# Patient Record
Sex: MALE | Race: WHITE | NOT HISPANIC OR LATINO | Employment: FULL TIME | URBAN - METROPOLITAN AREA
[De-identification: names, ages, dates, MRNs, and addresses within clinical notes are randomized per-mention and may not be internally consistent; named-entity substitution may affect disease eponyms.]

---

## 2017-04-24 ENCOUNTER — OFFICE VISIT - RIVER FALLS (OUTPATIENT)
Dept: FAMILY MEDICINE | Facility: CLINIC | Age: 42
End: 2017-04-24

## 2017-04-24 ASSESSMENT — MIFFLIN-ST. JEOR: SCORE: 1713.71

## 2017-10-04 ENCOUNTER — AMBULATORY - RIVER FALLS (OUTPATIENT)
Dept: FAMILY MEDICINE | Facility: CLINIC | Age: 42
End: 2017-10-04

## 2018-01-15 ENCOUNTER — OFFICE VISIT - RIVER FALLS (OUTPATIENT)
Dept: FAMILY MEDICINE | Facility: CLINIC | Age: 43
End: 2018-01-15

## 2018-01-15 ASSESSMENT — MIFFLIN-ST. JEOR: SCORE: 1706.46

## 2018-01-16 LAB
CHOLEST SERPL-MCNC: 183 MG/DL
CHOLEST/HDLC SERPL: 4.5 {RATIO}
GLUCOSE BLD-MCNC: 98 MG/DL (ref 65–99)
HDLC SERPL-MCNC: 41 MG/DL
LDLC SERPL CALC-MCNC: 112 MG/DL
NONHDLC SERPL-MCNC: 142 MG/DL
TRIGL SERPL-MCNC: 189 MG/DL

## 2018-10-18 ENCOUNTER — AMBULATORY - RIVER FALLS (OUTPATIENT)
Dept: FAMILY MEDICINE | Facility: CLINIC | Age: 43
End: 2018-10-18

## 2019-10-14 ENCOUNTER — AMBULATORY - RIVER FALLS (OUTPATIENT)
Dept: FAMILY MEDICINE | Facility: CLINIC | Age: 44
End: 2019-10-14

## 2020-10-23 ENCOUNTER — AMBULATORY - RIVER FALLS (OUTPATIENT)
Dept: FAMILY MEDICINE | Facility: CLINIC | Age: 45
End: 2020-10-23

## 2021-11-04 ENCOUNTER — AMBULATORY - RIVER FALLS (OUTPATIENT)
Dept: FAMILY MEDICINE | Facility: CLINIC | Age: 46
End: 2021-11-04

## 2021-11-15 ENCOUNTER — OFFICE VISIT - RIVER FALLS (OUTPATIENT)
Dept: FAMILY MEDICINE | Facility: CLINIC | Age: 46
End: 2021-11-15

## 2021-11-15 ASSESSMENT — MIFFLIN-ST. JEOR: SCORE: 1730.04

## 2021-11-16 ENCOUNTER — COMMUNICATION - RIVER FALLS (OUTPATIENT)
Dept: FAMILY MEDICINE | Facility: CLINIC | Age: 46
End: 2021-11-16

## 2021-11-16 LAB
CHOLEST SERPL-MCNC: 180 MG/DL
CHOLEST/HDLC SERPL: 3.6 {RATIO}
GLUCOSE BLD-MCNC: 99 MG/DL (ref 65–99)
HDLC SERPL-MCNC: 50 MG/DL
LDLC SERPL CALC-MCNC: 100 MG/DL
NONHDLC SERPL-MCNC: 130 MG/DL
TRIGL SERPL-MCNC: 180 MG/DL

## 2022-02-12 VITALS
TEMPERATURE: 97.2 F | SYSTOLIC BLOOD PRESSURE: 124 MMHG | BODY MASS INDEX: 26.92 KG/M2 | HEIGHT: 70 IN | HEART RATE: 70 BPM | DIASTOLIC BLOOD PRESSURE: 74 MMHG | WEIGHT: 188 LBS

## 2022-02-12 VITALS
HEART RATE: 76 BPM | SYSTOLIC BLOOD PRESSURE: 110 MMHG | HEIGHT: 70 IN | WEIGHT: 184.4 LBS | TEMPERATURE: 97.6 F | BODY MASS INDEX: 26.4 KG/M2 | DIASTOLIC BLOOD PRESSURE: 76 MMHG

## 2022-02-12 VITALS
TEMPERATURE: 96.7 F | HEIGHT: 70 IN | HEART RATE: 84 BPM | DIASTOLIC BLOOD PRESSURE: 78 MMHG | SYSTOLIC BLOOD PRESSURE: 110 MMHG | WEIGHT: 182.8 LBS | BODY MASS INDEX: 26.17 KG/M2

## 2022-02-15 NOTE — NURSING NOTE
Comprehensive Intake Entered On:  10/14/2019 3:34 PM CDT    Performed On:  10/14/2019 3:34 PM CDT by Antonio Ambriz               Summary   Chief Complaint :   Pt here today for flu vaccine    Advance Directive :   No   Antonio Ambriz - 10/14/2019 3:34 PM CDT   Consents   Consent for Immunization Exchange :   Consent Granted   Consent for Immunizations to Providers :   Consent Granted   Antonio Ambriz - 10/14/2019 3:34 PM CDT

## 2022-02-15 NOTE — LETTER
(Inserted Image. Unable to display)   [address] [place]  [address]  November 16, 2021                      CITLALLI COTTRELL      [address] [place] [address]        Dear CITLALLI,    Thank you for selecting [facility] for your health care needs.  Below you will find the results of your recent test(s) done at our clinic.     Your tests look good.      Result Name Current Result Reference Range   Glucose Level (mg/dL)  99 11/15/2021 65 - 99   Cholesterol (mg/dL)  180 11/15/2021  - <200   HDL (mg/dL)  50 11/15/2021 > OR = 40 -    Triglyceride (mg/dL) ((H)) 180 11/15/2021  - <150   LDL ((H)) 100 11/15/2021    Cholesterol/HDL Ratio  3.6 11/15/2021  - <5.0   Non-HDL Cholesterol ((H)) 130 11/15/2021  - <130       Please contact me or my assistant at [phone] if you have any questions about your results.    Sincerely,        Yonathan Ahn MD        What do your labs mean?  Below is a glossary of commonly ordered labs:  LDL   Bad Cholesterol   HDL   Good Cholesterol  AST/ALT   Liver Function   Cr/Creatinine   Kidney Function  Microalbumin   Kidney Function  BUN   Kidney Function  PSA   Prostate    TSH   Thyroid Hormone  HgbA1c   Diabetes Test   Hgb (Hemoglobin)   Red Blood Cells

## 2022-02-15 NOTE — PROGRESS NOTES
Chief Complaint    annual px, fasting labs  History of Present Illness      General health status:  good      Diet:  regular      Exercise:  walking      Medical encounters:  none      Dental exam:  8/21      Eye exam:  2 years ago      Caffeine use:  occasional      Tobacco use:  no      Alcohol use:  occasionally      Lipid and diabetes screening:  due      Colon cancer screening:  discussed      Prostate cancer screening:  n/a      Immunizations:  up to date      Other concerns:  heart burn after beer, neck pain, dizziness upon arising in the am      Chronic disease:  none  Review of Systems          Constitutional:  No fever, No chills, No sweats, No weakness, No fatigue.            Eye:  No recent visual problem.            Ear/Nose/Mouth/Throat:  No decreased hearing, No nasal congestion, No sore throat.            Respiratory:  No shortness of breath, No cough.            Cardiovascular:  Negative, No chest pain, No palpitations, No peripheral edema.            Gastrointestinal:  No nausea, No vomiting, No diarrhea, No constipation, No heartburn.            Genitourinary:  No dysuria, No change in urine stream.            Hematology/Lymphatics:  No bruising tendency, No bleeding tendency.            Endocrine:  No cold intolerance, No heat intolerance.            Immunologic:  Negative.            Musculoskeletal:  No back pain, neck pain, No joint pain, No muscle pain.            Integumentary:  No rash, No dryness, No skin lesion.            Neurologic:  Alert and oriented X4, No headache.                Psychiatric:  No anxiety, No depression  Physical Exam   Vitals & Measurements    T: 97.2  F (Tympanic)  HR: 70 (Peripheral)  BP: 124/74     HT: 69.75 in  WT: 188 lb  BMI: 27.17           General:  Alert and oriented, No acute distress.            Eye:  Pupils are equal, round and reactive to light, Extraocular movements are intact, Normal conjunctiva.            HENT:  Normocephalic, Tympanic membranes are  clear, Oral mucosa is moist, No pharyngeal erythema, No sinus tenderness.            Neck:  Supple, Non-tender, No carotid bruit, No lymphadenopathy, No thyromegaly.            Respiratory:  Lungs are clear to auscultation, Respirations are non-labored, Breath sounds are equal, No chest wall tenderness.            Cardiovascular:  Normal rate, Regular rhythm, No murmur, No gallop, Good pulses equal in all extremities, Normal peripheral perfusion, No edema.            Gastrointestinal:  Soft, Non-tender, Non-distended, Normal bowel sounds, No organomegaly.           Genitourinary:  No CVA tenderness          Lymphatics:  WNL.            Musculoskeletal:  Normal range of motion, Normal strength, No tenderness, No swelling, No deformity.            Integumentary:  Warm, Dry, No rash.            Neurologic:  Alert, Oriented, Normal sensory, Normal motor function, No focal deficits.            Psychiatric:  Cooperative, Appropriate mood & affect.   Assessment/Plan       1. Annual physical exam (Z00.00)         Discussed diet, weight management, BMI, activity and exercise        Follow up in one year                2. Heart burn (R12)         advise avoidance of triggers, follow up if worsens                3. BPV (benign positional vertigo) (H81.10)         exam normal today, observe, follow up if worsens                4. Neck pain (M54.2)         appears muscular in nature, advise ROM exercises, neck strengthening, be mindful of ergonomics at work                5. Lipid screening (Z13.220)         Ordered:          Lipid panel with reflex to direct ldl* ([facility]), Specimen Type: Serum, Collection Date: 11/15/21 7:59:00 CST                6. Diabetes mellitus screening (Z13.1)         Ordered:          Glucose* ([facility]), Specimen Type: Serum, Collection Date: 11/15/21 7:59:00 CST           Patient Information     Name:CITLALLI COTTRELL      Address:      [address] [place] [id number]     Sex:Male     Date of  Birth:1975     [phone]     Emergency Contact:ERIK COTTRELL     MRN:[id number]     FIN:[id number]     Location:[facility]     Date of Service:11/15/2021      Primary Care Physician:       Todd Ahn MD, [phone]      Attending Physician:       Todd Ahn MD, [phone]  Problem List/Past Medical History    Ongoing     Seasonal allergies    Historical     No qualifying data  Procedure/Surgical History     Vasectomy (03/27/2008)     Excision biopsy of skin lesion (10/25/2006)     Excision biopsy of skin lesion (08/11/2005)  Medications   No active medications  Allergies    No known allergies  Social History    Smoking Status     Never smoker     Alcohol - Current      Current, 1-2 times per week, 1 drinks/episode average. 2 drinks/episode maximum.     Electronic Cigarette/Vaping      Electronic Cigarette Use: Never.     Employment/School      Employed, Work/School description: .     Exercise      Exercise frequency: Never.     Home/Environment      Marital status: . Spouse/Partner name: Erik. Risks in environment: Does not wear helmet.     Nutrition/Health      Type of diet: Regular.     Sexual      Sexually active: Yes. Sexual orientation: Heterosexual.     Substance Abuse - Denies Substance Abuse      Never     Tobacco - Denies Tobacco Use      Never (less than 100 in lifetime)  Family History    Crohn's disease: Daughter.    Enlarged prostate: Father.    Gallbladder: Mother.    Stroke: Sister.    Daughter: History is negative    Daughter: History is negative  Immunizations       Scheduled Immunizations       Dose Date(s)       SARS-CoV-2 (COVID-19) Pfizer-162b2       03/25/2021, 04/15/2021       Other Immunizations               influenza       09/17/2009, 11/01/2010       influenza virus vaccine, inactivated       10/13/2014, 10/12/2015, 10/04/2017, 10/18/2018, 10/14/2019, 10/23/2020, 11/04/2021       tetanus/diphth/pertuss (Tdap) adult/adol        04/10/2015

## 2022-02-15 NOTE — NURSING NOTE
Comprehensive Intake Entered On:  11/15/2021 7:36 AM CST    Performed On:  11/15/2021 7:30 AM CST by Taya Levy LPN               Summary   Chief Complaint :   annual px, fasting labs   Advance Directive :   No   Weight Measured :   188 lb(Converted to: 188 lb 0 oz, 85.275 kg)    Height Measured :   69.75 in(Converted to: 5 ft 10 in, 177.16 cm)    Body Mass Index :   27.17 kg/m2 (HI)    Body Surface Area :   2.05 m2   Systolic Blood Pressure :   124 mmHg   Diastolic Blood Pressure :   74 mmHg   Mean Arterial Pressure :   91 mmHg   Peripheral Pulse Rate :   70 bpm   BP Site :   Right arm   Pulse Site :   Radial artery   BP Method :   Manual   HR Method :   Manual   Temperature Tympanic :   97.2 DegF(Converted to: 36.2 DegC)  (LOW)    Taya Levy LPN - 11/15/2021 7:30 AM CST   Health Status   Allergies Verified? :   Yes   Medication History Verified? :   Yes   Pre-Visit Planning Status :   Completed   Tobacco Use? :   Never smoker   Taya Levy LPN - 11/15/2021 7:30 AM CST   Consents   Consent for Immunization Exchange :   Consent Granted   Consent for Immunizations to Providers :   Consent Granted   Taya Levy LPN - 11/15/2021 7:30 AM CST   Meds / Allergies   (As Of: 11/15/2021 7:36:22 AM CST)   Allergies (Active)   No known allergies  Estimated Onset Date:   Unspecified ; Created By:   Priscila Ruiz; Reaction Status:   Active ; Category:   Drug ; Substance:   No known allergies ; Type:   Allergy ; Updated By:   Priscila Ruiz; Reviewed Date:   11/15/2021 7:33 AM CST        Medication List   (As Of: 11/15/2021 7:36:22 AM CST)   Home Meds    zinc sulfate  :   zinc sulfate ; Status:   Processing ; Ordered As Mnemonic:   Zinc ; Action Display:   Complete ; Catalog Code:   zinc sulfate ; Order Dt/Tm:   11/15/2021 7:33:25 AM CST            Depression Screening   Little Interest - Pleasure in Activities :   Not at all   Feeling Down, Depressed, Hopeless :   Not at all   Initial Depression  Screen Score :   0 Score   Poor Appetite or Overeating :   Several days   Trouble Falling or Staying Asleep :   Several days   Feeling Tired or Little Energy :   Several days   Feeling Bad About Yourself :   Not at all   Trouble Concentrating :   Several days   Moving or Speaking Slowly :   Not at all   Thoughts Better Off Dead or Hurting Self :   Not at all   Difficulty at Work, Home, Getting Along :   Not difficult at all   Detailed Depression Screen Score :   4    Total Depression Screen Score :   4    Taya Levy LPN - 11/15/2021 7:30 AM CST   Social History   Social History   (As Of: 11/15/2021 7:36:22 AM CST)   Alcohol:  Current      Current, 1-2 times per week, 1 drinks/episode average.  2 drinks/episode maximum.   (Last Updated: 4/25/2017 8:23:28 AM CDT by Indu Lei)          Tobacco:  Denies Tobacco Use      Never (less than 100 in lifetime)   (Last Updated: 11/15/2021 7:30:27 AM CST by Taya Levy LPN)          Electronic Cigarette/Vaping:        Electronic Cigarette Use: Never.   (Last Updated: 11/15/2021 7:30:31 AM CST by Taya Levy LPN)          Substance Abuse:  Denies Substance Abuse      Never   (Last Updated: 4/25/2017 8:23:38 AM CDT by Indu Lei)          Employment/School:        Employed, Work/School description: .   (Last Updated: 4/13/2015 8:35:43 AM CDT by Casimiro Brown CMA)          Home/Environment:        Marital status: .  Spouse/Partner name: Alanna.  Risks in environment: Does not wear helmet.   (Last Updated: 4/25/2017 8:23:55 AM CDT by Indu Lei)          Nutrition/Health:        Type of diet: Regular.   (Last Updated: 4/25/2017 8:24:00 AM CDT by Indu Lei)          Exercise:        Exercise frequency: Never.   (Last Updated: 4/25/2017 8:24:06 AM CDT by Indu Lei)          Sexual:        Sexually active: Yes.  Sexual orientation: Heterosexual.   (Last Updated: 4/25/2017 8:24:14 AM CDT by Indu Lei)

## 2022-02-15 NOTE — PROGRESS NOTES
Patient:   CITLALLI COTTRELL            MRN: [id number]            FIN: [id number]               Age:   42 years     Sex:  Male     :  1975   Associated Diagnoses:   Well adult; Scrotal nodule   Author:   Todd Ahn MD      Visit Information      Date of Service: 01/15/2018 08:15 am  Performing Location: [facility]  Encounter#: [id number]      Primary Care Provider (PCP):  Todd Ahn MD    NPI# [id number]   Visit type:  Annual exam.    Accompanied by:  No one.    Source of history:  Self, Medical record.    History limitation:  None.       Chief Complaint   1/15/2018 8:16 AM CST    here for f/u--is needing fasting labs.   also found lump on right testicle over the past couple of wks.        Well Adult History   Well Adult History             The patient presents for well adult exam.  The patient's general health status is described as good.  The patient's diet is described as balanced.  Exercise: occasional.  Associated symptoms consist of none.  Additional pertinent history: daily caffeine use, tobacco use none and occasional alcohol use.       colonoscopy:  n/a  lipid and diabetes screening:  due  prostate cancer screening:  n/a  heart disease risk:  low  immunizations:  up to date   other:  found a lump on testicle         Review of Systems   Constitutional:  No fever, No chills, No sweats, No weakness, No fatigue.    Eye:  No recent visual problem.    Ear/Nose/Mouth/Throat:  No decreased hearing, No nasal congestion, No sore throat.    Respiratory:  No shortness of breath, No cough.    Cardiovascular:  Negative, No chest pain, No palpitations, No peripheral edema.    Gastrointestinal:  No nausea, No vomiting, No diarrhea, No constipation, No heartburn.    Genitourinary:  Negative except as documented in history of present illness, No dysuria, No change in urine stream.    Hematology/Lymphatics:  No bruising tendency, No bleeding tendency.    Endocrine:  No cold intolerance, No heat  intolerance.    Immunologic:  Negative.    Musculoskeletal:  No back pain, No neck pain, No joint pain, No muscle pain.    Integumentary:  No rash, No dryness, No skin lesion.    Neurologic:  Alert and oriented X4, No headache.    Psychiatric:  No anxiety, No depression.      PHQ9 and CAGE reviewed and discussed with patient.       Health Status   Allergies:    Allergic Reactions (Selected)  No known allergies   Medications:  (Selected)   Documented Medications  Documented  Zinc: ( 140 mg ), po, daily, 0 Refill(s), Type: Maintenance   Problem list:    All Problems  Seasonal allergies / SNOMED CT 747088681 / Confirmed      Histories   Past Medical History:    Active  Seasonal allergies (308142264)   Family History:    Gallbladder  Mother  Stroke  Sister  Crohn's disease  Daughter (Bailey)  Enlarged prostate  Father     Procedure history:    Vasectomy (SNOMED CT 91082890) on 3/27/2008 at 33 Years.  Excision biopsy of skin lesion (SNOMED CT 960568542) on 10/25/2006 at 31 Years.  Excision biopsy of skin lesion (SNOMED CT 537872108) on 8/11/2005 at 30 Years.   Social History:        Alcohol Assessment: Current            Current, 1-2 times per week, 1 drinks/episode average.  2 drinks/episode maximum.      Tobacco Assessment: Denies Tobacco Use            Never      Substance Abuse Assessment: Denies Substance Abuse            Never      Employment and Education Assessment            Employed, Work/School description: .      Home and Environment Assessment            Marital status: .  Spouse/Partner name: Alanna.  Risks in environment: Does not wear helmet.      Nutrition and Health Assessment            Type of diet: Regular.      Exercise and Physical Activity Assessment            Exercise frequency: Never.      Sexual Assessment            Sexually active: Yes.  Sexual orientation: Heterosexual.        Physical Examination   Vital Signs   1/15/2018 8:16 AM CST Temperature Tympanic 96.7 DegF   LOW    Peripheral Pulse Rate 84 bpm    Pulse Site Radial artery    HR Method Manual    Systolic Blood Pressure 110 mmHg    Diastolic Blood Pressure 78 mmHg    Mean Arterial Pressure 89 mmHg    BP Site Right arm    BP Method Manual      Measurements from flowsheet : Measurements   1/15/2018 8:16 AM CST Height Measured - Standard 69.75 in    Weight Measured - Standard 182.8 lb    BSA 2.02 m2    Body Mass Index 26.41 kg/m2  HI      General:  Alert and oriented, No acute distress.    Eye:  Pupils are equal, round and reactive to light, Extraocular movements are intact, Normal conjunctiva.    HENT:  Normocephalic, Tympanic membranes are clear, Oral mucosa is moist, No pharyngeal erythema, No sinus tenderness.    Neck:  Supple, Non-tender, No carotid bruit, No lymphadenopathy, No thyromegaly.    Respiratory:  Lungs are clear to auscultation, Respirations are non-labored, Breath sounds are equal, No chest wall tenderness.    Cardiovascular:  Normal rate, Regular rhythm, No murmur, No gallop, Good pulses equal in all extremities, Normal peripheral perfusion, No edema.    Gastrointestinal:  Soft, Non-tender, Non-distended, Normal bowel sounds, No organomegaly.    Genitourinary:  No costovertebral angle tenderness.         Scrotum: tender nodule right spermatic cord.         Testes: Within normal limits.         Epididymis: Within normal limits.    Lymphatics:  WNL.    Musculoskeletal:  Normal range of motion, Normal strength, No tenderness, No swelling, No deformity.    Integumentary:  Warm, Dry, No rash.    Neurologic:  Alert, Oriented, Normal sensory, Normal motor function, No focal deficits.    Psychiatric:  Cooperative, Appropriate mood & affect.       Impression and Plan   Diagnosis     Well adult (EQC39-IZ Z00.00).     Scrotal nodule (XVC63-OT N49.2).     Course:  Progressing as expected.    Plan:  Discussed health maintenance, diet, exercise, BMI discussed, scrotal US ordered.         Follow-up: After diagnostic test  to discuss results.    Orders     Orders (Selected)   Outpatient Orders  Ordered (Dispatched)  Glucose* ([facility]): Specimen Type: Serum, Collection Date: 01/15/18 8:24:00 CST  Lipid panel with reflex to direct ldl* ([facility]): Specimen Type: Serum, Collection Date: 01/15/18 8:24:00 CST  Completed  US Scrotal (Request): Scrotal nodule.

## 2022-02-15 NOTE — PROGRESS NOTES
Patient:   CITLALLI COTTRELL            MRN: [id number]            FIN: [id number]               Age:   42 years     Sex:  Male     :  1975   Associated Diagnoses:   Well adult   Author:   Todd Ahn MD      Visit Information      Date of Service: 2017 12:45 pm  Performing Location: [facility]  Encounter#: [id number]      Primary Care Provider (PCP):  Todd Ahn MD    NPI# [id number]   Visit type:  Annual exam.    Accompanied by:  No one.    Source of history:  Self, Medical record.    History limitation:  None.       Chief Complaint   2017 12:51 PM CDT   annual px and has form to have filled out.      Well Adult History   Well Adult History             The patient presents for well adult exam.  The patient's general health status is described as good.  The patient's diet is described as balanced.  Exercise: none.  Associated symptoms consist of none.  Medical encounters: none.  Additional pertinent history: daily caffeine use, tobacco use none and occasional alcohol use.       colonoscopy:  n/a  lipid and diabetes screening:  up to date   prostate cancer screening:  n/a  heart disease risk:  low  immunizations:  up to date   other:         Review of Systems   Constitutional:  No fever, No chills, No sweats, No weakness, No fatigue.    Eye:  No recent visual problem.    Ear/Nose/Mouth/Throat:  No decreased hearing, No nasal congestion, No sore throat.    Respiratory:  No shortness of breath, No cough.    Cardiovascular:  Negative, No chest pain, No palpitations, No peripheral edema.    Gastrointestinal:  No nausea, No vomiting, No diarrhea, No constipation, No heartburn.    Genitourinary:  No dysuria, No change in urine stream.    Hematology/Lymphatics:  No bruising tendency, No bleeding tendency.    Endocrine:  No cold intolerance, No heat intolerance.    Immunologic:  Negative.    Musculoskeletal:  No back pain, No neck pain, No joint pain, No muscle pain.    Integumentary:   No rash, No dryness, No skin lesion.    Neurologic:  Alert and oriented X4, No headache.    Psychiatric:  No anxiety, No depression.      PHQ9 and CAGE reviewed and discussed with patient.       Health Status   Allergies:    Allergic Reactions (Selected)  No known allergies   Medications:  (Selected)      Problem list:    No problem items selected or recorded.      Histories   Past Medical History:    No active or resolved past medical history items have been selected or recorded.   Family History:    Gallbladder  Mother     Procedure history:    Vasectomy (SNOMED CT 31274013) on 3/27/2008 at 33 Years.  Excision biopsy of skin lesion (SNOMED CT 380900569) on 10/25/2006 at 31 Years.  Excision biopsy of skin lesion (SNOMED CT 397612893) on 8/11/2005 at 30 Years.   Social History:        Alcohol Assessment: Current            Current                     Comments:                      05/19/2010 - Priscila Goldman                     very minimal use      Tobacco Assessment: Denies Tobacco Use            Never      Substance Abuse Assessment: Denies Substance Abuse      Employment and Education Assessment            Employed, Work/School description: .      Home and Environment Assessment            Marital status: .  Spouse/Partner name: Alanna.        Physical Examination   Vital Signs   4/24/2017 12:51 PM CDT Temperature Tympanic 97.6 DegF  LOW    Peripheral Pulse Rate 76 bpm    Pulse Site Radial artery    HR Method Manual    Systolic Blood Pressure 110 mmHg    Diastolic Blood Pressure 76 mmHg    Mean Arterial Pressure 87 mmHg    BP Site Right arm    BP Method Manual      Measurements from flowsheet : Measurements   4/24/2017 12:51 PM CDT Height Measured - Standard 69.75 in    Weight Measured - Standard 184.4 lb    BSA 2.03 m2    Body Mass Index 26.65 kg/m2      General:  Alert and oriented, No acute distress.    Eye:  Pupils are equal, round and reactive to light, Extraocular movements are  intact, Normal conjunctiva.    HENT:  Normocephalic, Tympanic membranes are clear, Oral mucosa is moist, No pharyngeal erythema, No sinus tenderness.    Neck:  Supple, Non-tender, No carotid bruit, No lymphadenopathy, No thyromegaly.    Respiratory:  Lungs are clear to auscultation, Respirations are non-labored, Breath sounds are equal, No chest wall tenderness.    Cardiovascular:  Normal rate, Regular rhythm, No murmur, No gallop, Good pulses equal in all extremities, Normal peripheral perfusion, No edema.    Gastrointestinal:  Soft, Non-tender, Non-distended, Normal bowel sounds, No organomegaly.    Genitourinary:  No costovertebral angle tenderness.    Lymphatics:  WNL.    Musculoskeletal:  Normal range of motion, Normal strength, No tenderness, No swelling, No deformity.    Integumentary:  Warm, Dry, No rash.    Neurologic:  Alert, Oriented, Normal sensory, Normal motor function, No focal deficits.    Psychiatric:  Cooperative, Appropriate mood & affect.       Impression and Plan   Diagnosis     Well adult (IWS57-DG Z00.00).     Course:  Progressing as expected.    Plan:  Discussed health maintenance, diet, exercise, BMI discussed.

## 2023-02-23 PROBLEM — J30.2 SEASONAL ALLERGIC RHINITIS: Status: ACTIVE | Noted: 2023-02-23

## 2023-02-27 ENCOUNTER — LAB (OUTPATIENT)
Dept: FAMILY MEDICINE | Facility: CLINIC | Age: 48
End: 2023-02-27

## 2023-02-27 ENCOUNTER — OFFICE VISIT (OUTPATIENT)
Dept: FAMILY MEDICINE | Facility: CLINIC | Age: 48
End: 2023-02-27
Payer: COMMERCIAL

## 2023-02-27 VITALS
OXYGEN SATURATION: 99 % | HEIGHT: 70 IN | WEIGHT: 193.6 LBS | SYSTOLIC BLOOD PRESSURE: 120 MMHG | HEART RATE: 80 BPM | RESPIRATION RATE: 16 BRPM | DIASTOLIC BLOOD PRESSURE: 81 MMHG | TEMPERATURE: 97.4 F | BODY MASS INDEX: 27.72 KG/M2

## 2023-02-27 DIAGNOSIS — N62 GYNECOMASTIA, MALE: Primary | ICD-10-CM

## 2023-02-27 DIAGNOSIS — Z13.6 SCREENING FOR CARDIOVASCULAR CONDITION: ICD-10-CM

## 2023-02-27 DIAGNOSIS — Z12.11 SCREEN FOR COLON CANCER: ICD-10-CM

## 2023-02-27 PROBLEM — M77.11 RIGHT TENNIS ELBOW: Status: ACTIVE | Noted: 2022-04-19

## 2023-02-27 LAB
CHOLEST SERPL-MCNC: 196 MG/DL
HDLC SERPL-MCNC: 45 MG/DL
LDLC SERPL CALC-MCNC: 109 MG/DL
NONHDLC SERPL-MCNC: 151 MG/DL
TRIGL SERPL-MCNC: 210 MG/DL

## 2023-02-27 PROCEDURE — 36415 COLL VENOUS BLD VENIPUNCTURE: CPT | Performed by: FAMILY MEDICINE

## 2023-02-27 PROCEDURE — 99212 OFFICE O/P EST SF 10 MIN: CPT | Performed by: FAMILY MEDICINE

## 2023-02-27 PROCEDURE — 80061 LIPID PANEL: CPT | Performed by: FAMILY MEDICINE

## 2023-02-27 NOTE — PROGRESS NOTES
"  Assessment & Plan     Gynecomastia, male  Male gynecomastia  Reassurance continue to monitor and if symptoms worsen or this changes pursue further work-up.    Screen for colon cancer  Cologuard has been ordered  - COLOGUARD([employer]); Future    Screening for cardiovascular condition  Lipid panel today  - Lipid panel reflex to direct LDL Fasting; Future             BMI:   Estimated body mass index is 27.98 kg/m  as calculated from the following:    Height as of this encounter: 1.772 m (5' 9.75\").    Weight as of this encounter: 87.8 kg (193 lb 9.6 oz).           No follow-ups on file.    Todd Ahn MD  [facility]    Yessenia Barfield is a 47 year old accompanied by his self, presenting for the following health issues:  Derm Problem (Check lump on chest)      History of Present Illness       Reason for visit:  Lump in my chest  Symptom onset:  1-2 weeks ago    He eats 0-1 servings of fruits and vegetables daily.He consumes 0 sweetened beverage(s) daily.He exercises with enough effort to increase his heart rate 10 to 19 minutes per day.  He exercises with enough effort to increase his heart rate 3 or less days per week.   He is taking medications regularly.       Patient is here to discuss a lump on the left side of his chest, breast area.  Noticed about 2 weeks ago, does have some tenderness when pressure is applied or bumped.  Denies trauma to area.    Patient also wants to discuss swollen lymph nodes to his groin area that he noticed around Christmas 2022.  He denied having pain, lumps have improved over time.  Denies any recent illness.    Review of Systems   Constitutional, HEENT, cardiovascular, pulmonary, gi and gu systems are negative, except as otherwise noted.      Objective    /81 (BP Location: Right arm, Patient Position: Sitting, Cuff Size: Adult Large)   Pulse 80   Temp 97.4  F (36.3  C) (Tympanic)   Resp 16   Ht 1.772 m (5' 9.75\")   Wt 87.8 kg (193 " lb 9.6 oz)   SpO2 99%   BMI 27.98 kg/m    Body mass index is 27.98 kg/m .  Physical Exam   Alert, oriented, no acute distress  Neck is supple no adenopathy  Lungs are clear  Heart has a regular rate and rhythm  Exam of the breast reveal slightly prominent breast tissue on the left.  There is a similar area on the right which is smaller.  No tenderness.  No discrete mass.  No skin changes.  Cooperative, appropriate affect

## 2023-02-27 NOTE — LETTER
February 27, 2023      Lico BELLAMY Sotomayor  [address] [id number]        Dear ,    We are writing to inform you of your test results.    Your test results fall within the expected range(s) or remain unchanged from previous results.  Please continue with current treatment plan.    Resulted Orders   Lipid panel reflex to direct LDL Fasting   Result Value Ref Range    Cholesterol 196 <200 mg/dL    Triglycerides 210 (H) <150 mg/dL    Direct Measure HDL 45 >=40 mg/dL    LDL Cholesterol Calculated 109 (H) <=100 mg/dL    Non HDL Cholesterol 151 (H) <130 mg/dL    Narrative    Cholesterol  Desirable:  <200 mg/dL    Triglycerides  Normal:  Less than 150 mg/dL  Borderline High:  150-199 mg/dL  High:  200-499 mg/dL  Very High:  Greater than or equal to 500 mg/dL    Direct Measure HDL  Female:  Greater than or equal to 50 mg/dL   Male:  Greater than or equal to 40 mg/dL    LDL Cholesterol  Desirable:  <100mg/dL  Above Desirable:  100-129 mg/dL   Borderline High:  130-159 mg/dL   High:  160-189 mg/dL   Very High:  >= 190 mg/dL    Non HDL Cholesterol  Desirable:  130 mg/dL  Above Desirable:  130-159 mg/dL  Borderline High:  160-189 mg/dL  High:  190-219 mg/dL  Very High:  Greater than or equal to 220 mg/dL       If you have any questions or concerns, please call the clinic at the number listed above.       Sincerely,      Todd Ahn MD

## 2023-03-11 LAB — NONINV COLON CA DNA+OCC BLD SCRN STL QL: NEGATIVE

## 2025-01-05 SDOH — HEALTH STABILITY: PHYSICAL HEALTH: ON AVERAGE, HOW MANY MINUTES DO YOU ENGAGE IN EXERCISE AT THIS LEVEL?: 20 MIN

## 2025-01-05 SDOH — HEALTH STABILITY: PHYSICAL HEALTH: ON AVERAGE, HOW MANY DAYS PER WEEK DO YOU ENGAGE IN MODERATE TO STRENUOUS EXERCISE (LIKE A BRISK WALK)?: 1 DAY

## 2025-01-05 ASSESSMENT — SOCIAL DETERMINANTS OF HEALTH (SDOH): HOW OFTEN DO YOU GET TOGETHER WITH FRIENDS OR RELATIVES?: TWICE A WEEK

## 2025-01-08 ENCOUNTER — OFFICE VISIT (OUTPATIENT)
Dept: FAMILY MEDICINE | Facility: CLINIC | Age: 50
End: 2025-01-08
Payer: COMMERCIAL

## 2025-01-08 VITALS
RESPIRATION RATE: 15 BRPM | TEMPERATURE: 98.1 F | SYSTOLIC BLOOD PRESSURE: 120 MMHG | HEART RATE: 69 BPM | WEIGHT: 186 LBS | HEIGHT: 69 IN | DIASTOLIC BLOOD PRESSURE: 80 MMHG | BODY MASS INDEX: 27.55 KG/M2 | OXYGEN SATURATION: 96 %

## 2025-01-08 DIAGNOSIS — Z01.84 IMMUNITY STATUS TESTING: ICD-10-CM

## 2025-01-08 DIAGNOSIS — Z23 ENCOUNTER FOR IMMUNIZATION: ICD-10-CM

## 2025-01-08 DIAGNOSIS — Z00.00 ROUTINE PHYSICAL EXAMINATION: Primary | ICD-10-CM

## 2025-01-08 DIAGNOSIS — Z11.59 NEED FOR HEPATITIS C SCREENING TEST: ICD-10-CM

## 2025-01-08 DIAGNOSIS — Z11.4 SCREENING FOR HIV (HUMAN IMMUNODEFICIENCY VIRUS): ICD-10-CM

## 2025-01-08 DIAGNOSIS — E66.3 OVERWEIGHT: ICD-10-CM

## 2025-01-08 DIAGNOSIS — E78.5 DYSLIPIDEMIA: ICD-10-CM

## 2025-01-08 DIAGNOSIS — Z12.5 SCREENING FOR PROSTATE CANCER: ICD-10-CM

## 2025-01-08 LAB
CHOLEST SERPL-MCNC: 198 MG/DL
EST. AVERAGE GLUCOSE BLD GHB EST-MCNC: 120 MG/DL
FASTING STATUS PATIENT QL REPORTED: NO
HBA1C MFR BLD: 5.8 %
HCV AB SERPL QL IA: NONREACTIVE
HDLC SERPL-MCNC: 47 MG/DL
LDLC SERPL CALC-MCNC: 112 MG/DL
NONHDLC SERPL-MCNC: 151 MG/DL
PSA SERPL DL<=0.01 NG/ML-MCNC: 0.64 NG/ML (ref 0–3.5)
TRIGL SERPL-MCNC: 194 MG/DL

## 2025-01-08 PROCEDURE — 99396 PREV VISIT EST AGE 40-64: CPT | Mod: 25 | Performed by: FAMILY MEDICINE

## 2025-01-08 PROCEDURE — 36415 COLL VENOUS BLD VENIPUNCTURE: CPT | Performed by: FAMILY MEDICINE

## 2025-01-08 PROCEDURE — 90673 RIV3 VACCINE NO PRESERV IM: CPT | Performed by: FAMILY MEDICINE

## 2025-01-08 PROCEDURE — 86706 HEP B SURFACE ANTIBODY: CPT | Performed by: FAMILY MEDICINE

## 2025-01-08 PROCEDURE — 91320 SARSCV2 VAC 30MCG TRS-SUC IM: CPT | Performed by: FAMILY MEDICINE

## 2025-01-08 PROCEDURE — 87389 HIV-1 AG W/HIV-1&-2 AB AG IA: CPT | Performed by: FAMILY MEDICINE

## 2025-01-08 PROCEDURE — 90480 ADMN SARSCOV2 VAC 1/ONLY CMP: CPT | Performed by: FAMILY MEDICINE

## 2025-01-08 PROCEDURE — 83036 HEMOGLOBIN GLYCOSYLATED A1C: CPT | Performed by: FAMILY MEDICINE

## 2025-01-08 PROCEDURE — G0103 PSA SCREENING: HCPCS | Performed by: FAMILY MEDICINE

## 2025-01-08 PROCEDURE — 90472 IMMUNIZATION ADMIN EACH ADD: CPT | Performed by: FAMILY MEDICINE

## 2025-01-08 PROCEDURE — 90750 HZV VACC RECOMBINANT IM: CPT | Performed by: FAMILY MEDICINE

## 2025-01-08 PROCEDURE — 80061 LIPID PANEL: CPT | Performed by: FAMILY MEDICINE

## 2025-01-08 PROCEDURE — 90471 IMMUNIZATION ADMIN: CPT | Performed by: FAMILY MEDICINE

## 2025-01-08 PROCEDURE — 86803 HEPATITIS C AB TEST: CPT | Performed by: FAMILY MEDICINE

## 2025-01-08 ASSESSMENT — PAIN SCALES - GENERAL: PAINLEVEL_OUTOF10: NO PAIN (0)

## 2025-01-08 NOTE — LETTER
"January 9, 2025      Lico Sotomayor  [address] [facility] [address]        Dear ,    We are writing to inform you of your test results.    Your routine HIV screen was normal.     Your hepatitis C screen based on age criteria was normal.     Your cholesterol results were elevated.  Ensure regular exercise, healthy diet, and weight loss modifications in order to further improve.  Weight goal < 180 pounds initially, < 175 pounds ideally.  We will plan to recheck your labs while fasting in the next 6-12 months to ensure desired improvement.      Your labs suggest \"pre-diabetes\".  Goal \"average blood sugar\" (i.e. A1c) is < 5.7%.  Your A1c was 5.8%. Ensure regular exercise, healthy diet, and weight loss modifications in order to further improve.  Weight goal < 180 pounds initially, < 175 pounds ideally.  We will continue to follow closely.      Your prostate cancer screening test (i.e.PSA) was normal.     No evidence for current immunity to hepatitis B.  Consider hepatitis B immunization series in future in order to achieve desired immunity.     Resulted Orders   HIV Antigen Antibody Combo   Result Value Ref Range    HIV Antigen Antibody Combo Nonreactive Nonreactive      Comment:      Negative HIV-1 p24 antigen and HIV-1/2 antibody screening test results usually indicate the absence of HIV-1 and HIV-2 infection. However, such negative results do not rule-out acute HIV infection.  If acute HIV-1 or HIV-2 infection is suspected, detection of HIV-1 or HIV-2 RNA  is recommended. This result is obtained using the Roche Elecsys HIV Duo method on the andrew e801 immunoassay analyzer.   Hepatitis C Screen Reflex to HCV RNA Quant and Genotype   Result Value Ref Range    Hepatitis C Antibody Nonreactive Nonreactive      Comment:      A nonreactive screening test result does not exclude the possibility of exposure to or infection with HCV. Nonreactive screening test results in individuals with prior exposure to HCV " may be due to antibody levels below the limit of detection of this assay or lack of reactivity to the HCV antigens used in this assay. Patients with recent HCV infections (<3 months from time of exposure) may have false-negative HCV antibody results due to the time needed for seroconversion (average of 8 to 9 weeks).   Lipid panel reflex to direct LDL Non-fasting   Result Value Ref Range    Cholesterol 198 <200 mg/dL    Triglycerides 194 (H) <150 mg/dL    Direct Measure HDL 47 >=40 mg/dL    LDL Cholesterol Calculated 112 (H) <100 mg/dL    Non HDL Cholesterol 151 (H) <130 mg/dL    Patient Fasting > 8hrs? No     Narrative    Cholesterol  Desirable: < 200 mg/dL  Borderline High: 200 - 239 mg/dL  High: >= 240 mg/dL    Triglycerides  Normal: < 150 mg/dL  Borderline High: 150 - 199 mg/dL  High: 200-499 mg/dL  Very High: >= 500 mg/dL    Direct Measure HDL  Female: >= 50 mg/dL   Male: >= 40 mg/dL    LDL Cholesterol  Desirable: < 100 mg/dL  Above Desirable: 100 - 129 mg/dL   Borderline High: 130 - 159 mg/dL   High:  160 - 189 mg/dL   Very High: >= 190 mg/dL    Non HDL Cholesterol  Desirable: < 130 mg/dL  Above Desirable: 130 - 159 mg/dL  Borderline High: 160 - 189 mg/dL  High: 190 - 219 mg/dL  Very High: >= 220 mg/dL   Hemoglobin A1c   Result Value Ref Range    Estimated Average Glucose 120 (H) <117 mg/dL    Hemoglobin A1C 5.8 (H) <5.7 %      Comment:      Normal <5.7%   Prediabetes 5.7-6.4%    Diabetes 6.5% or higher     Note: Adopted from ADA consensus guidelines.   Prostate Specific Antigen Screen   Result Value Ref Range    Prostate Specific Antigen Screen 0.64 0.00 - 3.50 ng/mL    Narrative    This result is obtained using the Roche Elecsys total PSA method on the andrew e801 immunoassay analyzer, which is an ultrasensitive method. Results obtained with different assay methods or kits cannot be used interchangeably.  This test is intended for initial prostate cancer screening. PSA values exceeding the age-specific limits  are suspicious for prostate disease, but additional testing, such as prostate biopsy, is needed to diagnose prostate pathology. The American Cancer Society recommends annual examination with digital rectal examination and serum PSA beginning at age 50 and for men with a life expectancy of at least 10 years after detection of prostate cancer. For men in high-risk groups, such as  Americans or men with a first-degree relative diagnosed at a younger age, testing should begin at a younger age. It is generally recommended that information be provided to patients about the benefits and limitations of testing and treatment so they can make informed decisions.   Hepatitis B Surface Antibody   Result Value Ref Range    Hepatitis B Surface Antibody Nonreactive       Comment:      Nonreactive results, defined as anti-HBs levels of less than 8.5 mIU/mL, indicate a lack of recovery from acute or chronic hepatitis B or inadequate immune response to HBV vaccination.    Hepatitis B Surface Antibody Instrument Value <3.50 <8.5 m[IU]/mL       If you have any questions or concerns, please call the clinic at the number listed above.       Sincerely,      Kelton Sultana MD    Electronically signed

## 2025-01-08 NOTE — PROGRESS NOTES
Assessment/Plan:     Routine physical examination  Routine healthcare maintenance.  Preventative cares reviewed.  Annual physical exams to continue.  Completed Cologuard March 4, 2023 and will repeat at 3-year interval.  Patient declines colonoscopy for colon cancer screening unless something changes.    Overweight  Overweight status.  A1c obtained for diabetes screening.  Weight goal less than 180 pounds initially, less than 175 pounds ideally.  - Hemoglobin A1c    Dyslipidemia  Dyslipidemia.  Nonfasting lipid cascade obtained today per patient request.  Elects not to return for fasting labs at a later date.  - Lipid panel reflex to direct LDL Non-fasting    Screening for HIV (human immunodeficiency virus)  Routine HIV screen based on age criteria.  - HIV Antigen Antibody Combo    Screening for prostate cancer  PSA for prostate cancer screening based on age criteria.  - Prostate Specific Antigen Screen    Need for hepatitis C screening test  Routine hepatitis C screen based on age criteria.  - Hepatitis C Screen Reflex to HCV RNA Quant and Genotype    Immunity status testing  Hepatitis B surface antibody for immunity status testing.  - Hepatitis B Surface Antibody    Encounter for immunization  Flu shot, COVID shot and Shingrix immunization provided with booster anticipated in 2 to 6 months.  - INFLUENZA VACCINE TRIVALENT(FLUBLOK)  - COVID-19 12+ (PFIZER)  - ZOSTER RECOMBINANT ADJUVANTED (SHINGRIX)                 Subjective:     Lico Sotomayor is a 49 year old male who presents for an annual exam.  Establishing care.  Father-in-law Nathaniel Young.  Patient with mild overweight status.  Dyslipidemia historically.  No new concerns.  Needs paperwork showing that he had physical exam completed for cost savings on insurance while with [facility].  Past medical social and family history reviewed and updated as noted below.  Comprehensive review of systems as above otherwise all negative.       - Alanna  3  daughters - Merry (24), Bailey (22) -  with 2 kids; Rita (18)  2 grandchildren  Tobacco:  none  EtOH:  1x/week (beer)  Mom -  Dad -  1 older sis - h/o CVA age 36  Surgeries:  none  Hospitalizations:  none  Work:   for [employer]  Hobbies:  remodeling a cabin; handy stuff, building stuff, working in the yard      Healthy Habits:   HPI     Immunization History   Administered Date(s) Administered    COVID-19 MONOVALENT 12+ (Pfizer) 03/25/2021, 04/15/2021, 11/29/2021    Flu, Unspecified 10/13/2014    Influenza (IIV3) PF 09/17/2009, 11/01/2010    Influenza Vaccine >6 months,quad, PF 10/12/2015, 10/04/2017, 10/14/2019, 10/23/2020, 11/04/2021    Influenza Vaccine, 6+MO IM (QUADRIVALENT W/PRESERVATIVES) 10/18/2018, 10/14/2019    Influenza,INJ,MDCK,PF,Quad >6mo(Flucelvax) 11/04/2022    TDAP (Adacel,Boostrix) 04/10/2015     Immunization status:  needs flu, COVID, and Shingrix series today    No current outpatient medications on file.     No past medical history on file.  Past Surgical History:   Procedure Laterality Date    SKIN LESION EXCISION      x2;   08/11/2005 and 10/25/2006    VASECTOMY  03/27/2008     Patient has no known allergies.  Family History   Problem Relation Age of Onset    Hypertension Mother     Gallbladder Disease Mother     Enlarged prostate Father     Alcoholism Father     Cerebrovascular Disease Sister     Seizure Disorder Sister     Diabetes Maternal Grandmother         Type 2    Diabetes Paternal Grandfather         Type 2    Crohn's Disease Daughter      Social History     Socioeconomic History    Marital status:      Spouse name: Alanna    Number of children: 3    Years of education: Not on file    Highest education level: Bachelor's degree (e.g., BA, AB, BS)   Occupational History    Occupation:    Tobacco Use    Smoking status: Never    Smokeless tobacco: Never   Vaping Use    Vaping status: Never Used   Substance and Sexual Activity     Alcohol use: Yes    Drug use: Never    Sexual activity: Not on file   Other Topics Concern    Not on file   Social History Narrative    Not on file     Social Drivers of Health     Financial Resource Strain: Low Risk  (1/5/2025)    Financial Resource Strain     Within the past 12 months, have you or your family members you live with been unable to get utilities (heat, electricity) when it was really needed?: No   Food Insecurity: Low Risk  (1/5/2025)    Food Insecurity     Within the past 12 months, did you worry that your food would run out before you got money to buy more?: No     Within the past 12 months, did the food you bought just not last and you didn t have money to get more?: No   Transportation Needs: Low Risk  (1/5/2025)    Transportation Needs     Within the past 12 months, has lack of transportation kept you from medical appointments, getting your medicines, non-medical meetings or appointments, work, or from getting things that you need?: No   Physical Activity: Insufficiently Active (1/5/2025)    Exercise Vital Sign     Days of Exercise per Week: 1 day     Minutes of Exercise per Session: 20 min   Stress: Not on file   Social Connections: Unknown (1/5/2025)    Social Connection and Isolation Panel [NHANES]     Frequency of Communication with Friends and Family: Not on file     Frequency of Social Gatherings with Friends and Family: Twice a week     Attends Orthodoxy Services: Not on file     Active Member of Clubs or Organizations: Not on file     Attends Club or Organization Meetings: Not on file     Marital Status: Not on file   Interpersonal Safety: Low Risk  (1/8/2025)    Interpersonal Safety     Do you feel physically and emotionally safe where you currently live?: Yes     Within the past 12 months, have you been hit, slapped, kicked or otherwise physically hurt by someone?: No     Within the past 12 months, have you been humiliated or emotionally abused in other ways by your partner or  "ex-partner?: No   Housing Stability: Low Risk  (1/5/2025)    Housing Stability     Do you have housing? : Yes     Are you worried about losing your housing?: No       Review of Systems  Comprehensive ROS: as above, otherwise all negative.           Objective:     /80   Pulse 69   Temp 98.1  F (36.7  C)   Resp 15   Ht 1.759 m (5' 9.25\")   Wt 84.4 kg (186 lb)   SpO2 96%   BMI 27.27 kg/m    Body mass index is 27.27 kg/m .    Physical    General Appearance:    Alert, cooperative, no distress, appears stated age.  BMI 27.27.   Head:    Normocephalic, without obvious abnormality, atraumatic   Eyes:    PERRL, conjunctiva/corneas clear, EOM's intact, fundi     benign, both eyes        Ears:    Normal TM's and external ear canals, both ears   Nose:   Nares normal, septum midline, mucosa normal, no drainage    or sinus tenderness   Throat:   Lips, mucosa, and tongue normal; teeth and gums normal   Neck:   Supple, symmetrical, trachea midline, no adenopathy;        thyroid:  No enlargement/tenderness/nodules; no carotid    bruit or JVD   Back:     Symmetric, no curvature, ROM normal, no CVA tenderness   Lungs:     Clear to auscultation bilaterally, respirations unlabored   Chest wall:    No tenderness or deformity   Heart:    Regular rate and rhythm, S1 and S2 normal, no murmur, rub   or gallop   Abdomen:     Soft, non-tender, bowel sounds active all four quadrants,     no masses, no organomegaly.     Genitalia:    Normal male without lesion, discharge or tenderness.  No inguinal hernia noted.     Rectal:    Normal tone.  Prostate normal/symmetric, no masses or tenderness.   Extremities:   Extremities normal, atraumatic, no cyanosis or edema   Pulses:   2+ and symmetric all extremities   Skin:   Skin color, texture, turgor normal, no rashes or lesions   Lymph nodes:   Cervical, supraclavicular, and axillary nodes normal   Neurologic:   CNII-XII intact. Normal strength, sensation and reflexes       throughout "                This note has been dictated using voice recognition software and as a result may contain minor grammatical errors and unintended word substitutions.

## 2025-01-09 LAB
HBV SURFACE AB SERPL IA-ACNC: <3.5 M[IU]/ML
HBV SURFACE AB SERPL IA-ACNC: NONREACTIVE M[IU]/ML
HIV 1+2 AB+HIV1 P24 AG SERPL QL IA: NONREACTIVE

## 2025-03-13 ENCOUNTER — ALLIED HEALTH/NURSE VISIT (OUTPATIENT)
Dept: FAMILY MEDICINE | Facility: CLINIC | Age: 50
End: 2025-03-13
Payer: COMMERCIAL

## 2025-03-13 DIAGNOSIS — Z23 ENCOUNTER FOR IMMUNIZATION: Primary | ICD-10-CM

## 2025-03-13 NOTE — PROGRESS NOTES
Prior to immunization administration, verified patients identity using patient s name and date of birth. Please see Immunization Activity for additional information.     Is the patient's temperature normal (100.5 or less)? Yes     Patient MEETS CRITERIA. PROCEED with vaccine administration.      Patient instructed to remain in clinic for 15 minutes afterwards, and to report any adverse reactions.      Link to Ancillary Visit Immunization Standing Orders SmartSet     Screening performed by Jen Joy MA on 3/13/2025 at 3:00 PM.